# Patient Record
Sex: MALE | URBAN - METROPOLITAN AREA
[De-identification: names, ages, dates, MRNs, and addresses within clinical notes are randomized per-mention and may not be internally consistent; named-entity substitution may affect disease eponyms.]

---

## 2022-11-15 ENCOUNTER — APPOINTMENT (OUTPATIENT)
Dept: URBAN - METROPOLITAN AREA CLINIC 277 | Age: 67
Setting detail: DERMATOLOGY
End: 2022-11-15

## 2022-11-15 DIAGNOSIS — L20.89 OTHER ATOPIC DERMATITIS: ICD-10-CM

## 2022-11-15 DIAGNOSIS — L259 CONTACT DERMATITIS AND OTHER ECZEMA, UNSPECIFIED CAUSE: ICD-10-CM

## 2022-11-15 PROBLEM — L23.9 ALLERGIC CONTACT DERMATITIS, UNSPECIFIED CAUSE: Status: ACTIVE | Noted: 2022-11-15

## 2022-11-15 PROCEDURE — OTHER DUPIXENT MONITORING: OTHER

## 2022-11-15 PROCEDURE — OTHER PRESCRIPTION: OTHER

## 2022-11-15 PROCEDURE — OTHER TREATMENT REGIMEN: OTHER

## 2022-11-15 PROCEDURE — OTHER MIPS QUALITY: OTHER

## 2022-11-15 PROCEDURE — 99203 OFFICE O/P NEW LOW 30 MIN: CPT

## 2022-11-15 PROCEDURE — OTHER COUNSELING: OTHER

## 2022-11-15 RX ORDER — TRIAMCINOLONE ACETONIDE 1 MG/G
OINTMENT TOPICAL
Qty: 454 | Refills: 5 | Status: ERX | COMMUNITY
Start: 2022-11-15

## 2022-11-15 RX ORDER — DUPILUMAB 300 MG/2ML
INJECTION, SOLUTION SUBCUTANEOUS
Qty: 1 | Refills: 10 | Status: ERX | COMMUNITY
Start: 2022-11-15

## 2022-11-15 ASSESSMENT — ITCH NUMERIC RATING SCALE: HOW SEVERE IS YOUR ITCHING?: 6

## 2022-11-15 ASSESSMENT — LOCATION DETAILED DESCRIPTION DERM
LOCATION DETAILED: RIGHT DISTAL PRETIBIAL REGION
LOCATION DETAILED: LEFT DISTAL PRETIBIAL REGION

## 2022-11-15 ASSESSMENT — LOCATION ZONE DERM: LOCATION ZONE: LEG

## 2022-11-15 ASSESSMENT — BSA ECZEMA: % BODY COVERED IN ECZEMA: 35

## 2022-11-15 ASSESSMENT — LOCATION SIMPLE DESCRIPTION DERM
LOCATION SIMPLE: LEFT PRETIBIAL REGION
LOCATION SIMPLE: RIGHT PRETIBIAL REGION

## 2022-11-15 NOTE — PROCEDURE: TREATMENT REGIMEN
Plan: Pt has been receiving Dupixent through [facility] and his insurance recently stopped covering it. The patients untreated ISGA score is reported as 4---> his ISGA score when he was receiving regular maintenance dose Dupixent HAD decreased to a 1, unfortunately after helost access to Dupixent his ISGA score has returned to his previous (pre-Dupixent) severity of an ISGA  4. His dermatitis flared up tremendously and has begun to affect his physical, emotional and mental health. Plan: Pt has been receiving Dupixent through [place] [facility] and his insurance recently stopped covering it. The patients untreated ISGA score is reported as 4---> his ISGA score when he was receiving regular maintenance dose Dupixent HAD decreased to a 1, unfortunately after helost access to Dupixent his ISGA score has returned to his previous (pre-Dupixent) severity of an ISGA  4. His dermatitis flared up tremendously and has begun to affect his physical, emotional and mental health.

## 2022-12-06 RX ORDER — TRIAMCINOLONE ACETONIDE 1 MG/G
OINTMENT TOPICAL
Qty: 454 | Refills: 5 | Status: ERX